# Patient Record
Sex: MALE | Race: OTHER | HISPANIC OR LATINO | URBAN - METROPOLITAN AREA
[De-identification: names, ages, dates, MRNs, and addresses within clinical notes are randomized per-mention and may not be internally consistent; named-entity substitution may affect disease eponyms.]

---

## 2022-08-19 ENCOUNTER — EMERGENCY (EMERGENCY)
Facility: HOSPITAL | Age: 47
LOS: 1 days | Discharge: HOME | End: 2022-08-19
Attending: STUDENT IN AN ORGANIZED HEALTH CARE EDUCATION/TRAINING PROGRAM
Payer: SELF-PAY

## 2022-08-19 VITALS
DIASTOLIC BLOOD PRESSURE: 73 MMHG | TEMPERATURE: 98 F | OXYGEN SATURATION: 96 % | RESPIRATION RATE: 18 BRPM | WEIGHT: 210.76 LBS | HEART RATE: 75 BPM | SYSTOLIC BLOOD PRESSURE: 111 MMHG

## 2022-08-19 VITALS
RESPIRATION RATE: 17 BRPM | HEART RATE: 70 BPM | DIASTOLIC BLOOD PRESSURE: 65 MMHG | TEMPERATURE: 98 F | OXYGEN SATURATION: 98 % | SYSTOLIC BLOOD PRESSURE: 121 MMHG

## 2022-08-19 LAB
ALBUMIN SERPL ELPH-MCNC: 4 G/DL — SIGNIFICANT CHANGE UP (ref 3.5–5)
ALP SERPL-CCNC: 46 U/L — SIGNIFICANT CHANGE UP (ref 40–120)
ALT FLD-CCNC: 41 U/L DA — SIGNIFICANT CHANGE UP (ref 10–60)
ANION GAP SERPL CALC-SCNC: 8 MMOL/L — SIGNIFICANT CHANGE UP (ref 5–17)
APPEARANCE UR: CLEAR — SIGNIFICANT CHANGE UP
AST SERPL-CCNC: 23 U/L — SIGNIFICANT CHANGE UP (ref 10–40)
BACTERIA # UR AUTO: NEGATIVE /HPF — SIGNIFICANT CHANGE UP
BASOPHILS # BLD AUTO: 0.02 K/UL — SIGNIFICANT CHANGE UP (ref 0–0.2)
BASOPHILS NFR BLD AUTO: 0.4 % — SIGNIFICANT CHANGE UP (ref 0–2)
BILIRUB SERPL-MCNC: 0.6 MG/DL — SIGNIFICANT CHANGE UP (ref 0.2–1.2)
BILIRUB UR-MCNC: NEGATIVE — SIGNIFICANT CHANGE UP
BUN SERPL-MCNC: 15 MG/DL — SIGNIFICANT CHANGE UP (ref 7–18)
CALCIUM SERPL-MCNC: 9.6 MG/DL — SIGNIFICANT CHANGE UP (ref 8.4–10.5)
CHLORIDE SERPL-SCNC: 106 MMOL/L — SIGNIFICANT CHANGE UP (ref 96–108)
CO2 SERPL-SCNC: 24 MMOL/L — SIGNIFICANT CHANGE UP (ref 22–31)
COLOR SPEC: YELLOW — SIGNIFICANT CHANGE UP
CREAT SERPL-MCNC: 0.98 MG/DL — SIGNIFICANT CHANGE UP (ref 0.5–1.3)
DIFF PNL FLD: ABNORMAL
EGFR: 97 ML/MIN/1.73M2 — SIGNIFICANT CHANGE UP
EOSINOPHIL # BLD AUTO: 0.08 K/UL — SIGNIFICANT CHANGE UP (ref 0–0.5)
EOSINOPHIL NFR BLD AUTO: 1.4 % — SIGNIFICANT CHANGE UP (ref 0–6)
EPI CELLS # UR: NEGATIVE /HPF — SIGNIFICANT CHANGE UP
GLUCOSE SERPL-MCNC: 84 MG/DL — SIGNIFICANT CHANGE UP (ref 70–99)
GLUCOSE UR QL: NEGATIVE — SIGNIFICANT CHANGE UP
HCT VFR BLD CALC: 48.7 % — SIGNIFICANT CHANGE UP (ref 39–50)
HGB BLD-MCNC: 16.5 G/DL — SIGNIFICANT CHANGE UP (ref 13–17)
IMM GRANULOCYTES NFR BLD AUTO: 0.5 % — SIGNIFICANT CHANGE UP (ref 0–1.5)
KETONES UR-MCNC: NEGATIVE — SIGNIFICANT CHANGE UP
LEUKOCYTE ESTERASE UR-ACNC: NEGATIVE — SIGNIFICANT CHANGE UP
LIDOCAIN IGE QN: 116 U/L — SIGNIFICANT CHANGE UP (ref 73–393)
LYMPHOCYTES # BLD AUTO: 1.96 K/UL — SIGNIFICANT CHANGE UP (ref 1–3.3)
LYMPHOCYTES # BLD AUTO: 34.3 % — SIGNIFICANT CHANGE UP (ref 13–44)
MCHC RBC-ENTMCNC: 30 PG — SIGNIFICANT CHANGE UP (ref 27–34)
MCHC RBC-ENTMCNC: 33.9 GM/DL — SIGNIFICANT CHANGE UP (ref 32–36)
MCV RBC AUTO: 88.5 FL — SIGNIFICANT CHANGE UP (ref 80–100)
MONOCYTES # BLD AUTO: 0.55 K/UL — SIGNIFICANT CHANGE UP (ref 0–0.9)
MONOCYTES NFR BLD AUTO: 9.6 % — SIGNIFICANT CHANGE UP (ref 2–14)
NEUTROPHILS # BLD AUTO: 3.07 K/UL — SIGNIFICANT CHANGE UP (ref 1.8–7.4)
NEUTROPHILS NFR BLD AUTO: 53.8 % — SIGNIFICANT CHANGE UP (ref 43–77)
NITRITE UR-MCNC: NEGATIVE — SIGNIFICANT CHANGE UP
NRBC # BLD: 0 /100 WBCS — SIGNIFICANT CHANGE UP (ref 0–0)
PH UR: 6 — SIGNIFICANT CHANGE UP (ref 5–8)
PLATELET # BLD AUTO: 174 K/UL — SIGNIFICANT CHANGE UP (ref 150–400)
POTASSIUM SERPL-MCNC: 4.3 MMOL/L — SIGNIFICANT CHANGE UP (ref 3.5–5.3)
POTASSIUM SERPL-SCNC: 4.3 MMOL/L — SIGNIFICANT CHANGE UP (ref 3.5–5.3)
PROT SERPL-MCNC: 8.1 G/DL — SIGNIFICANT CHANGE UP (ref 6–8.3)
PROT UR-MCNC: NEGATIVE — SIGNIFICANT CHANGE UP
RBC # BLD: 5.5 M/UL — SIGNIFICANT CHANGE UP (ref 4.2–5.8)
RBC # FLD: 12.3 % — SIGNIFICANT CHANGE UP (ref 10.3–14.5)
RBC CASTS # UR COMP ASSIST: SIGNIFICANT CHANGE UP /HPF (ref 0–2)
SODIUM SERPL-SCNC: 138 MMOL/L — SIGNIFICANT CHANGE UP (ref 135–145)
SP GR SPEC: 1.02 — SIGNIFICANT CHANGE UP (ref 1.01–1.02)
UROBILINOGEN FLD QL: NEGATIVE — SIGNIFICANT CHANGE UP
WBC # BLD: 5.71 K/UL — SIGNIFICANT CHANGE UP (ref 3.8–10.5)
WBC # FLD AUTO: 5.71 K/UL — SIGNIFICANT CHANGE UP (ref 3.8–10.5)
WBC UR QL: SIGNIFICANT CHANGE UP /HPF (ref 0–5)

## 2022-08-19 PROCEDURE — 83690 ASSAY OF LIPASE: CPT

## 2022-08-19 PROCEDURE — 99285 EMERGENCY DEPT VISIT HI MDM: CPT

## 2022-08-19 PROCEDURE — 80053 COMPREHEN METABOLIC PANEL: CPT

## 2022-08-19 PROCEDURE — 74177 CT ABD & PELVIS W/CONTRAST: CPT | Mod: MA

## 2022-08-19 PROCEDURE — 74177 CT ABD & PELVIS W/CONTRAST: CPT | Mod: 26,MA

## 2022-08-19 PROCEDURE — 81001 URINALYSIS AUTO W/SCOPE: CPT

## 2022-08-19 PROCEDURE — 87086 URINE CULTURE/COLONY COUNT: CPT

## 2022-08-19 PROCEDURE — 99284 EMERGENCY DEPT VISIT MOD MDM: CPT | Mod: 25

## 2022-08-19 PROCEDURE — 36415 COLL VENOUS BLD VENIPUNCTURE: CPT

## 2022-08-19 PROCEDURE — 85025 COMPLETE CBC W/AUTO DIFF WBC: CPT

## 2022-08-19 RX ORDER — ACETAMINOPHEN 500 MG
650 TABLET ORAL ONCE
Refills: 0 | Status: COMPLETED | OUTPATIENT
Start: 2022-08-19 | End: 2022-08-19

## 2022-08-19 RX ORDER — IBUPROFEN 200 MG
1 TABLET ORAL
Qty: 30 | Refills: 0
Start: 2022-08-19

## 2022-08-19 RX ORDER — IBUPROFEN 200 MG
600 TABLET ORAL ONCE
Refills: 0 | Status: COMPLETED | OUTPATIENT
Start: 2022-08-19 | End: 2022-08-19

## 2022-08-19 RX ORDER — DIAZEPAM 5 MG
5 TABLET ORAL ONCE
Refills: 0 | Status: DISCONTINUED | OUTPATIENT
Start: 2022-08-19 | End: 2022-08-19

## 2022-08-19 RX ORDER — CYCLOBENZAPRINE HYDROCHLORIDE 10 MG/1
1 TABLET, FILM COATED ORAL
Qty: 10 | Refills: 0
Start: 2022-08-19

## 2022-08-19 RX ADMIN — Medication 600 MILLIGRAM(S): at 12:50

## 2022-08-19 RX ADMIN — Medication 5 MILLIGRAM(S): at 12:52

## 2022-08-19 RX ADMIN — Medication 600 MILLIGRAM(S): at 13:50

## 2022-08-19 RX ADMIN — Medication 650 MILLIGRAM(S): at 12:50

## 2022-08-19 RX ADMIN — Medication 650 MILLIGRAM(S): at 13:50

## 2022-08-19 NOTE — ED ADULT NURSE NOTE - CAS ELECT INFOMATION PROVIDED
Discharge instructions given in Ukrainian with [id number]/Walter..Printed  discharge instructions given in Ukrainian. and patient verbalized understanding of the instructions./DC instructions

## 2022-08-19 NOTE — ED PROVIDER NOTE - PROGRESS NOTE DETAILS
interpretor id [id number]: Patient pain improved. remains neuro intact. well appearing. ct and lab wnl. likely msk pain. patient given med, return precaution and instructed to f.u pmd, clinically stable on dc

## 2022-08-19 NOTE — CHART NOTE - NSCHARTNOTEFT_GEN_A_CORE
Patient is a 48 y/o male presenting to ED with c/o of pain, low back.  SW consulted as patient requesting assistance applying for emergency medicaid. SW met with patient and spouse Katty Mann (T: [phone]) in ED intake area to introduce self/ role and assist with any SW needs. Patient presented as A+Ox3, pleasant and receptive to social work involvement. Patient is primarily Slovak speaking and SW utilized [employer] (Anamaria, ID# [id number]) to translate conversation. Patient confirmed his mobile number (T: [phone]). Patient requested spouse remain at bedside for assessment. Patient reported that he and spouse recently immigrated from [facility] and were staying at friend's house in [place], [place]. Patient reported that he and his spouse decided to move to [place]  10 days ago because his friend in [place] did not have enough room and they could not afford to pay rent. Patient reported that he and spouse are currently living in a family shelter ( [address] [place] [address]). Patient reported that he and spouse have an assigned  at shelter and will be providing further assistance. SW encourage patient to continue following up with assigned  at shelter. Patient reported that they do not have family or friends that resides in [facility]. SW provided contact information for [facility] to apply for emergency medicaid. Additionally, ANDREW also provided patient with written list of [facility] for medical follow up. Patient expressed appreciation for SW assistance. Patient denies further assistance at this time. SW provided contact information and remains available to assist. ANDREW informed medical team of the above.

## 2022-08-19 NOTE — ED PROVIDER NOTE - OBJECTIVE STATEMENT
interpreor id [id number]: 47 y.o presenting with 3 days of lower back pain. denies n, v, fever, diarrhea, dysuria, groin numbness, urinary/fecal retention/incontinence. Denies trauma, fall. endorses he was told by his sw to contact our sw concern for emergency medicaid. immigrated here 8 days ago from [facility]

## 2022-08-19 NOTE — ED PROVIDER NOTE - NSFOLLOWUPINSTRUCTIONS_ED_ALL_ED_FT
Distensión lumbar    LO QUE NECESITA SABER:    La distensión lumbar es otoniel lesión en los músculos o tendones de la parte inferior de la espalda. Los tendones son los tejidos levy que conectan a los músculos con los huesos. La parte inferior de la espalda sostiene la mayor parte de urbano peso corporal y facilita urbano habilidad de moverse, torcerse y doblarse.    INSTRUCCIONES SOBRE EL [facility]ITAL[facility]:    Busque atención médica de inmediato si:  •Usted oye o siente un estallido en la parte inferior de la espalda.      •Usted tiene mayor inflamación o dolor en la parte inferior de urbano espalda.      •Usted tiene dificultad para  las piernas.      •Chauncey piernas están entumecidas.      Llame a urbano médico si:  •Tiene fiebre.      •El dolor no desaparece, incluso después del tratamiento.      •Usted tiene preguntas o inquietudes acerca de urbano condición o cuidado.      Medicamentos:Los siguientes medicamentos pueden  ser recetados por urbano médico:  •Acetaminofénalivia el dolor y baja la fiebre. Está disponible sin receta médica. Pregunte la cantidad y la frecuencia con que debe tomarlos. Siga las indicaciones. Lesa las etiquetas de todos los demás medicamentos que esté usando para saber si también contienen acetaminofén, o pregunte a urbano médico o farmacéutico. El acetaminofén puede causar daño en el hígado cuando no se david de forma correcta.      •AINEcomo el ibuprofeno, ayudan a disminuir la inflamación, el dolor y la fiebre. Bijan medicamento está disponible con o sin otoniel receta médica. Los MAYANK pueden causar sangrado estomacal o problemas renales en ciertas personas. Si usted david un medicamento anticoagulante, siempre pregúntele a urbano médico si los MAYANK son seguros para usted. Siempre lesa la etiqueta de bijan medicamento y siga las instrucciones.      •Relajantes muscularesayudan a reducir dolor y espasmos musculares.      •Puede administrarsepodrían administrarse. Pregunte al médico cómo debe lakeisha bijan medicamento de forma herbert. Algunos medicamentos recetados para el dolor contienen acetaminofén. No tome otros medicamentos que contengan acetaminofén sin consultarlo con urbnao médico. Demasiado acetaminofeno puede causar daño al hígado. Los medicamentos recetados para el dolor podrían causar estreñimiento. Pregunte a urbano médico gonzales prevenir o tratar estreñimiento.      •Ricardo chauncey medicamentos gonzales se le haya indicado.Consulte con urbano médico si usted missy que urbano medicamento no le está ayudando o si presenta efectos secundarios. Infórmele si es alérgico a cualquier medicamento. Mantenga otoniel lista actualizada de los medicamentos, las vitaminas y los productos herbales que david. Incluya los siguientes datos de los medicamentos: cantidad, frecuencia y motivo de administración. Traiga con usted la lista o los envases de las píldoras a chauncey citas de seguimiento. Lleve la lista de los medicamentos con usted en timmy de otoniel emergencia.      Cuidados personales:  •Descansesegún las indicaciones. Es probable que necesite descansar en cama niurka un tiempo después de lesionarse. No levante objetos pesados.      •Aplique hieloen la espalda de 15 a 20 minutos cada hora o gonzales se le indique. Use otoniel compresa de hielo o ponga hielo triturado en otoniel bolsa de plástico. Cúbrala con otoniel toalla. El hielo ayuda a evitar daño al tejido y a disminuir la inflamación y el dolor.      •Aplique caloren la parte inferior de la espalda de 20 a 30 minutos cada 2 horas niurka los días que le indiquen. El calor ayuda a disminuir el dolor y los espasmos musculares.      •Comience lentamente a aumentar urbano nivel de actividadconforme disminuya el dolor o gonzales se lo indiquen.      Evite otra distensión lumbar:  •Use movimientos corporales correctos.?Flexione la cadera y las rodillas cuando vaya a levantar un objeto. No doble la cintura. Utilice los músculos de las piernas mientras levanta urbano carga. No use urbano espalda. Mantenga el objeto cerca de urbano pecho mientras lo levanta. No se tuerza, ni levante cualquier cosa por encima de urbano cintura.  Cómo levantar objetos de forma herbert           ?Cambie urbano posición frecuentemente cuando pase mucho tiempo de pie. Descanse un pie sobre otoniel caja pequeña o un reposapiés e intercambie con el otro pie frecuentemente.      ?No permanezca sentado por lapsos de tiempo prolongados. Cuando sea necesario hacerlo, siéntese en otoniel silla de respaldo recto con los pies apoyados en el suelo.      ?Nunca alcance, jale ni empuje mientras se encuentra sentando.      •Entre en calor antes de hacer ejercicio.Gina ejercicios que fortalezcan chauncey músculos de la espalda. Pregunte a urbano médico acerca del mejor plan de ejercicio para usted.  Calentamiento y enfriamiento           •Mantenga un peso saludable.Consulte con urbano médico cuánto debería pesar. Pida que le ayude a crear un plan para bajar de peso si usted tiene sobrepeso.      Acuda a la consulta de control con urbano médico según las indicaciones:Anote chauncey preguntas para que se acuerde de hacerlas niurka chauncey visitas.

## 2022-08-19 NOTE — ED PROVIDER NOTE - PATIENT PORTAL LINK FT
You can access the FollowMyHealth Patient Portal offered by [facility] by registering at the following website: [email or web address]. By joining [facility]’s FollowMyHealth portal, you will also be able to view your health information using other applications (apps) compatible with our system.

## 2022-08-19 NOTE — ED PROVIDER NOTE - NSFOLLOWUPCLINICS_GEN_ALL_ED_FT
[facility]  [facility]  [address] - [address] [place] [address]  Phone: [phone]  Fax: [phone]

## 2022-08-19 NOTE — ED PROVIDER NOTE - CLINICAL SUMMARY MEDICAL DECISION MAKING FREE TEXT BOX
Patient presenting with back pain, neuro intact, ambulatory. will obtain lab, ct, pain control and reassess

## 2022-08-19 NOTE — ED ADULT NURSE NOTE - OBJECTIVE STATEMENT
AOX4 +ambulatory patient reports lower back pain radiating to the L side abdomen. Denies any fevers chills no trauma. Patient requesting SW because hes a new immigrant.

## 2022-08-19 NOTE — ED ADULT TRIAGE NOTE - CHIEF COMPLAINT QUOTE
Pt c/o lower back pain X3 days, woke up with this pain, denies injury/trauma    requesting to see , currently staying at a shelter for new immigrants

## 2022-08-20 LAB
CULTURE RESULTS: SIGNIFICANT CHANGE UP
SPECIMEN SOURCE: SIGNIFICANT CHANGE UP

## 2025-07-13 NOTE — ED ADULT NURSE NOTE - TEMPLATE
Past Medical History:   Diagnosis Date    Diabetes mellitus, type 2     Hyperlipidemia     Hypertension        History reviewed. No pertinent surgical history.    Review of patient's allergies indicates:  No Known Allergies    No current facility-administered medications on file prior to encounter.     Current Outpatient Medications on File Prior to Encounter   Medication Sig    ciprofloxacin HCl (CIPRO) 500 MG tablet Take 1 tablet (500 mg total) by mouth every 12 (twelve) hours.    insulin NPH-insulin regular, 70/30, (HUMULIN 70/30 U-100 INSULIN) 100 unit/mL (70-30) injection Inject 10 Units into the skin 2 (two) times daily. (Patient taking differently: Inject 10 Units into the skin 2 (two) times daily. PAP referred)    alcohol swabs (ALCOHOL PADS) PadM Apply 1 each topically as needed.    amLODIPine (NORVASC) 5 MG tablet Take 1 tablet (5 mg total) by mouth once daily.    atorvastatin (LIPITOR) 40 MG tablet Take 1 tablet (40 mg total) by mouth once daily.    insulin syringe-needle U-100 0.3 mL 29 gauge Syrg 1 Needle by Misc.(Non-Drug; Combo Route) route 2 (two) times a day.    losartan-hydrochlorothiazide 50-12.5 mg (HYZAAR) 50-12.5 mg per tablet Take 1 tablet by mouth once daily.    metFORMIN (GLUCOPHAGE) 500 MG tablet Take 1 tablet (500 mg total) by mouth 2 (two) times daily with meals.     Family History       Problem Relation (Age of Onset)    Diabetes Father    Heart disease Mother    Hypertension Father          Tobacco Use    Smoking status: Every Day     Current packs/day: 0.00     Types: Vaping with nicotine, Cigarettes     Last attempt to quit: 2019     Years since quittin.5    Smokeless tobacco: Never   Substance and Sexual Activity    Alcohol use: Yes     Comment: 6 pack a day    Drug use: Never    Sexual activity: Yes     Partners: Female     Review of Systems   All other systems reviewed and are negative.    Objective:     Vital Signs (Most Recent):  Temp: 98.7 °F (37.1 °C) (25  1145)  Pulse: 102 (07/13/25 1145)  Resp: 20 (07/13/25 1145)  BP: 128/83 (07/13/25 1145)  SpO2: (!) 94 % (07/13/25 1145) Vital Signs (24h Range):  Temp:  [98.1 °F (36.7 °C)-102.3 °F (39.1 °C)] 98.7 °F (37.1 °C)  Pulse:  [] 102  Resp:  [10-20] 20  SpO2:  [91 %-97 %] 94 %  BP: ()/(52-95) 128/83     Weight: 100.9 kg (222 lb 8 oz)  Body mass index is 32.86 kg/m².     Physical Exam  Vitals and nursing note reviewed.   Constitutional:       Appearance: He is ill-appearing.   HENT:      Mouth/Throat:      Mouth: Mucous membranes are dry.   Eyes:      Pupils: Pupils are equal, round, and reactive to light.   Neck:      Vascular: No carotid bruit.   Cardiovascular:      Rate and Rhythm: Regular rhythm. Tachycardia present.      Heart sounds: No murmur heard.  Pulmonary:      Effort: Pulmonary effort is normal.      Breath sounds: Normal breath sounds.   Abdominal:      General: Abdomen is flat. Bowel sounds are normal. There is no distension.      Palpations: Abdomen is soft.   Musculoskeletal:      Right lower leg: Edema present.      Left lower leg: No edema.      Comments: Right foot wound, see media   Skin:     General: Skin is warm and dry.   Neurological:      General: No focal deficit present.      Mental Status: He is alert. Mental status is at baseline.   Psychiatric:         Mood and Affect: Mood normal.              CRANIAL NERVES     CN III, IV, VI   Pupils are equal, round, and reactive to light.       Significant Labs: All pertinent labs within the past 24 hours have been reviewed.    Significant Imaging: I have reviewed all pertinent imaging results/findings within the past 24 hours.   Abdominal Pain, N/V/D No